# Patient Record
Sex: MALE | Race: ASIAN | Employment: FULL TIME | URBAN - METROPOLITAN AREA
[De-identification: names, ages, dates, MRNs, and addresses within clinical notes are randomized per-mention and may not be internally consistent; named-entity substitution may affect disease eponyms.]

---

## 2021-06-22 ENCOUNTER — ORDER TRANSCRIPTION (OUTPATIENT)
Dept: PHYSICAL THERAPY | Facility: HOSPITAL | Age: 46
End: 2021-06-22

## 2021-06-22 DIAGNOSIS — I89.0 LYMPHEDEMA OF ARM: Primary | ICD-10-CM

## 2022-01-03 ENCOUNTER — APPOINTMENT (OUTPATIENT)
Dept: CV DIAGNOSTICS | Facility: HOSPITAL | Age: 47
DRG: 193 | End: 2022-01-03
Attending: INTERNAL MEDICINE
Payer: COMMERCIAL

## 2022-01-03 ENCOUNTER — HOSPITAL ENCOUNTER (OUTPATIENT)
Age: 47
Discharge: ANOTHER HOSPITAL | End: 2022-01-03
Payer: COMMERCIAL

## 2022-01-03 ENCOUNTER — HOSPITAL ENCOUNTER (INPATIENT)
Facility: HOSPITAL | Age: 47
LOS: 3 days | Discharge: HOME | DRG: 193 | End: 2022-01-06
Attending: EMERGENCY MEDICINE | Admitting: INTERNAL MEDICINE
Payer: COMMERCIAL

## 2022-01-03 ENCOUNTER — APPOINTMENT (OUTPATIENT)
Dept: GENERAL RADIOLOGY | Facility: HOSPITAL | Age: 47
DRG: 193 | End: 2022-01-03
Attending: EMERGENCY MEDICINE
Payer: COMMERCIAL

## 2022-01-03 ENCOUNTER — APPOINTMENT (OUTPATIENT)
Dept: CT IMAGING | Facility: HOSPITAL | Age: 47
DRG: 193 | End: 2022-01-03
Attending: EMERGENCY MEDICINE
Payer: COMMERCIAL

## 2022-01-03 VITALS
OXYGEN SATURATION: 80 % | HEART RATE: 134 BPM | SYSTOLIC BLOOD PRESSURE: 118 MMHG | RESPIRATION RATE: 18 BRPM | TEMPERATURE: 98 F | DIASTOLIC BLOOD PRESSURE: 93 MMHG

## 2022-01-03 DIAGNOSIS — J96.01 ACUTE RESPIRATORY FAILURE WITH HYPOXIA (HCC): Primary | ICD-10-CM

## 2022-01-03 DIAGNOSIS — E87.70 HYPERVOLEMIA, UNSPECIFIED HYPERVOLEMIA TYPE: ICD-10-CM

## 2022-01-03 DIAGNOSIS — R09.02 HYPOXIA: Primary | ICD-10-CM

## 2022-01-03 DIAGNOSIS — M79.89 LEFT ARM SWELLING: ICD-10-CM

## 2022-01-03 DIAGNOSIS — R00.0 TACHYCARDIA: ICD-10-CM

## 2022-01-03 PROBLEM — E87.1 HYPONATREMIA: Status: ACTIVE | Noted: 2022-01-03

## 2022-01-03 LAB
ALBUMIN SERPL-MCNC: 2.1 G/DL (ref 3.4–5)
ALBUMIN/GLOB SERPL: 0.5 {RATIO} (ref 1–2)
ALP LIVER SERPL-CCNC: 81 U/L
ALT SERPL-CCNC: 16 U/L
ANION GAP SERPL CALC-SCNC: 7 MMOL/L (ref 0–18)
APTT PPP: 135 SECONDS (ref 23.3–35.6)
APTT PPP: 34 SECONDS (ref 23.3–35.6)
AST SERPL-CCNC: 30 U/L (ref 15–37)
ATRIAL RATE: 128 BPM
BASOPHILS # BLD AUTO: 0.07 X10(3) UL (ref 0–0.2)
BASOPHILS NFR BLD AUTO: 0.6 %
BILIRUB SERPL-MCNC: 0.3 MG/DL (ref 0.1–2)
BUN BLD-MCNC: 10 MG/DL (ref 7–18)
CALCIUM BLD-MCNC: 8.3 MG/DL (ref 8.5–10.1)
CHLORIDE SERPL-SCNC: 93 MMOL/L (ref 98–112)
CO2 SERPL-SCNC: 24 MMOL/L (ref 21–32)
CREAT BLD-MCNC: 0.87 MG/DL
D DIMER PPP FEU-MCNC: 9.03 UG/ML FEU (ref ?–0.5)
EOSINOPHIL # BLD AUTO: 0.16 X10(3) UL (ref 0–0.7)
EOSINOPHIL NFR BLD AUTO: 1.3 %
ERYTHROCYTE [DISTWIDTH] IN BLOOD BY AUTOMATED COUNT: 15.5 %
GLOBULIN PLAS-MCNC: 4.1 G/DL (ref 2.8–4.4)
GLUCOSE BLD-MCNC: 137 MG/DL (ref 70–99)
HCT VFR BLD AUTO: 35.7 %
HGB BLD-MCNC: 12 G/DL
IMM GRANULOCYTES # BLD AUTO: 0.14 X10(3) UL (ref 0–1)
IMM GRANULOCYTES NFR BLD: 1.1 %
INR BLD: 1.02 (ref 0.8–1.2)
LYMPHOCYTES # BLD AUTO: 1.72 X10(3) UL (ref 1–4)
LYMPHOCYTES NFR BLD AUTO: 13.8 %
MCH RBC QN AUTO: 28.8 PG (ref 26–34)
MCHC RBC AUTO-ENTMCNC: 33.6 G/DL (ref 31–37)
MCV RBC AUTO: 85.8 FL
MONOCYTES # BLD AUTO: 1.11 X10(3) UL (ref 0.1–1)
MONOCYTES NFR BLD AUTO: 8.9 %
NEUTROPHILS # BLD AUTO: 9.29 X10 (3) UL (ref 1.5–7.7)
NEUTROPHILS # BLD AUTO: 9.29 X10(3) UL (ref 1.5–7.7)
NEUTROPHILS NFR BLD AUTO: 74.3 %
NT-PROBNP SERPL-MCNC: 1333 PG/ML (ref ?–125)
OSMOLALITY SERPL CALC.SUM OF ELEC: 259 MOSM/KG (ref 275–295)
P AXIS: 47 DEGREES
P-R INTERVAL: 130 MS
PLATELET # BLD AUTO: 593 10(3)UL (ref 150–450)
POTASSIUM SERPL-SCNC: 4.1 MMOL/L (ref 3.5–5.1)
PROCALCITONIN SERPL-MCNC: 0.51 NG/ML (ref ?–0.16)
PROT SERPL-MCNC: 6.2 G/DL (ref 6.4–8.2)
PROTHROMBIN TIME: 13.4 SECONDS (ref 11.6–14.8)
Q-T INTERVAL: 294 MS
QRS DURATION: 72 MS
QTC CALCULATION (BEZET): 429 MS
R AXIS: 65 DEGREES
RBC # BLD AUTO: 4.16 X10(6)UL
SARS-COV-2 RNA RESP QL NAA+PROBE: NOT DETECTED
SODIUM SERPL-SCNC: 124 MMOL/L (ref 136–145)
T AXIS: 43 DEGREES
TRIGL SERPL-MCNC: 385 MG/DL (ref 30–149)
TROPONIN I HIGH SENSITIVITY: 14 NG/L
VENTRICULAR RATE: 128 BPM
WBC # BLD AUTO: 12.5 X10(3) UL (ref 4–11)

## 2022-01-03 PROCEDURE — 99222 1ST HOSP IP/OBS MODERATE 55: CPT | Performed by: HOSPITALIST

## 2022-01-03 PROCEDURE — 93306 TTE W/DOPPLER COMPLETE: CPT | Performed by: INTERNAL MEDICINE

## 2022-01-03 PROCEDURE — 71045 X-RAY EXAM CHEST 1 VIEW: CPT | Performed by: EMERGENCY MEDICINE

## 2022-01-03 PROCEDURE — 99205 OFFICE O/P NEW HI 60 MIN: CPT | Performed by: NURSE PRACTITIONER

## 2022-01-03 PROCEDURE — U0002 COVID-19 LAB TEST NON-CDC: HCPCS | Performed by: NURSE PRACTITIONER

## 2022-01-03 RX ORDER — CODEINE PHOSPHATE AND GUAIFENESIN 10; 100 MG/5ML; MG/5ML
5 SOLUTION ORAL EVERY 6 HOURS PRN
COMMUNITY

## 2022-01-03 RX ORDER — HYDROCODONE BITARTRATE AND ACETAMINOPHEN 5; 325 MG/1; MG/1
2 TABLET ORAL EVERY 6 HOURS PRN
Status: DISCONTINUED | OUTPATIENT
Start: 2022-01-03 | End: 2022-01-06

## 2022-01-03 RX ORDER — EVEROLIMUS 5 MG/1
1 TABLET ORAL
COMMUNITY

## 2022-01-03 RX ORDER — LENVATINIB 18 MG/DAY
1 KIT ORAL
COMMUNITY

## 2022-01-03 RX ORDER — HEPARIN SODIUM 5000 [USP'U]/ML
80 INJECTION INTRAVENOUS; SUBCUTANEOUS ONCE
Status: COMPLETED | OUTPATIENT
Start: 2022-01-03 | End: 2022-01-03

## 2022-01-03 RX ORDER — PREDNISONE 50 MG/1
50 TABLET ORAL DAILY
COMMUNITY
End: 2022-01-06

## 2022-01-03 RX ORDER — AMLODIPINE BESYLATE 10 MG/1
10 TABLET ORAL DAILY
COMMUNITY
End: 2022-01-06

## 2022-01-03 RX ORDER — PREDNISONE 50 MG/1
50 TABLET ORAL EVERY 6 HOURS
Status: COMPLETED | OUTPATIENT
Start: 2022-01-03 | End: 2022-01-04

## 2022-01-03 RX ORDER — FUROSEMIDE 10 MG/ML
40 INJECTION INTRAMUSCULAR; INTRAVENOUS ONCE
Status: COMPLETED | OUTPATIENT
Start: 2022-01-03 | End: 2022-01-03

## 2022-01-03 RX ORDER — HYDROCODONE BITARTRATE AND ACETAMINOPHEN 5; 325 MG/1; MG/1
TABLET ORAL
COMMUNITY
Start: 2021-12-16

## 2022-01-03 RX ORDER — HEPARIN SODIUM AND DEXTROSE 10000; 5 [USP'U]/100ML; G/100ML
18 INJECTION INTRAVENOUS ONCE
Status: COMPLETED | OUTPATIENT
Start: 2022-01-03 | End: 2022-01-03

## 2022-01-03 RX ORDER — CODEINE PHOSPHATE AND GUAIFENESIN 10; 100 MG/5ML; MG/5ML
5 SOLUTION ORAL EVERY 6 HOURS PRN
Status: DISCONTINUED | OUTPATIENT
Start: 2022-01-03 | End: 2022-01-06

## 2022-01-03 RX ORDER — ATORVASTATIN CALCIUM 80 MG/1
80 TABLET, FILM COATED ORAL NIGHTLY
COMMUNITY

## 2022-01-03 RX ORDER — BENZONATATE 100 MG/1
100 CAPSULE ORAL 3 TIMES DAILY PRN
COMMUNITY
Start: 2021-12-27

## 2022-01-03 RX ORDER — ATORVASTATIN CALCIUM 80 MG/1
80 TABLET, FILM COATED ORAL NIGHTLY
Status: DISCONTINUED | OUTPATIENT
Start: 2022-01-03 | End: 2022-01-06

## 2022-01-03 RX ORDER — PROCHLORPERAZINE MALEATE 10 MG
10 TABLET ORAL EVERY 6 HOURS PRN
COMMUNITY

## 2022-01-03 RX ORDER — LISINOPRIL 2.5 MG/1
2.5 TABLET ORAL DAILY
COMMUNITY
End: 2022-01-06

## 2022-01-03 RX ORDER — ONDANSETRON 8 MG/1
8 TABLET, ORALLY DISINTEGRATING ORAL ONCE
COMMUNITY

## 2022-01-03 RX ORDER — HEPARIN SODIUM AND DEXTROSE 10000; 5 [USP'U]/100ML; G/100ML
INJECTION INTRAVENOUS CONTINUOUS
Status: DISCONTINUED | OUTPATIENT
Start: 2022-01-03 | End: 2022-01-04

## 2022-01-03 RX ORDER — ACETAMINOPHEN 325 MG/1
650 TABLET ORAL EVERY 6 HOURS PRN
Status: DISCONTINUED | OUTPATIENT
Start: 2022-01-03 | End: 2022-01-06

## 2022-01-03 RX ORDER — LEVOTHYROXINE SODIUM 0.05 MG/1
50 TABLET ORAL
COMMUNITY

## 2022-01-03 RX ORDER — DIPHENHYDRAMINE HCL 50 MG
50 CAPSULE ORAL ONCE
Status: COMPLETED | OUTPATIENT
Start: 2022-01-04 | End: 2022-01-04

## 2022-01-03 RX ORDER — BENZONATATE 100 MG/1
100 CAPSULE ORAL 3 TIMES DAILY PRN
Status: DISCONTINUED | OUTPATIENT
Start: 2022-01-03 | End: 2022-01-06

## 2022-01-03 NOTE — ED INITIAL ASSESSMENT (HPI)
PT ARRIVED TO ED FROM  FOR C/O LOW SPO2. PT STATES SPO2 WAS 85% RA AT HOME AND WHEN HE ARRIVED AT  IT IS WAS 76%. UPON ARRIVAL TO ED IT WAS 74% RA. PT DENIES FEELING SOB/CP.  PT IS CURRENTLY RECEIVING TX FOR KIDNEY CA.

## 2022-01-03 NOTE — ED PROVIDER NOTES
Patient Seen in: Immediate [address]      History   Patient presents with:  Difficulty Breathing    Stated Complaint: Oxygen levels have dropped. SPO2 dropped. Subjective:    This is a 21-year-old male with a history of kidney cancer sonny °F (36.4 °C)   Temp src Temporal   SpO2 (!) 80 %   O2 Device None (Room air)       Current:BP (!) 118/93   Pulse (!) 134   Temp 97.6 °F (36.4 °C) (Temporal)   Resp 18   SpO2 (!) 80%         Physical Exam  Vitals and nursing note reviewed.    Constitutional: found to be 80% on room air, tachycardic in the 130s. He is in no respiratory distress. Lung sounds are clear bilaterally. Skin is pale and dry. Reports no chest pain or shortness of breath.   He was placed on 4 L of supplemental O2 via nasal cannula wi

## 2022-01-03 NOTE — ED PROVIDER NOTES
Patient Seen in: [facility] Emergency Department      History   Patient presents with:  Difficulty Breathing    Stated Complaint: hypoxic from immediate care    Subjective:   HPI    This is a 44-year-old man history of renal cancer, currently on ora Edema  Pulmonary:  Pulmonary effort is normal.  Normal breath sounds. No wheezing, rhonchi or rales.    Abdominal: Soft nontender nondistended, normal bowel sounds, no guarding no rebound tenderness  Skin: Warm and dry  Neurological: Awake alert, speech is tach  Reading: Normal intervals normal axis no acute ischemic changes           XR CHEST AP PORTABLE  (CPT=71045)    Result Date: 1/3/2022  PROCEDURE:  XR CHEST AP PORTABLE  (CPT=71045)  TECHNIQUE:  AP chest radiograph was obtained. COMPARISON:  None.   IN and teaching time. MDM      59-year-old gentleman history of renal cancer currently on oral chemotherapy, here with volume overload.   He does have a 4 L oxygen requirement, BNP is elevated at 1300, bedside ultrasound shows no pericardial effusion, does

## 2022-01-03 NOTE — H&P
[facility]  History and Physical     Mateuszwclaudy Alicea Patient Status:  Emergency    10/8/1975 MRN [id number]   Location [address] [facility] Attending Andrew Munguia MD   Hosp Day # 0 PCP Omid Lee     Chief Complaint: pox 85% last prior to encounter. HYDROcodone-acetaminophen 5-325 MG Oral Tab, TAKE 1 TABLET BY MOUTH EVERY 6 HOURS AS NEEDED FOR PAIN USE AFTER NON-OPIOID PAIN MANAGEMENT STRATEGIES.  DO NOT TAKE WITH TYLENOL, Disp: , Rfl:   benzonatate 100 MG Oral Cap, Take 100 mg by 0.3   TP 6.2*       Estimated Creatinine Clearance: 88.2 mL/min (based on SCr of 0.87 mg/dL).     Recent Labs   Lab 01/03/22  0959   PTP 13.4   INR 1.02       COVID-19 Lab Results    COVID-19  Lab Results   Component Value Date    COVID19 Not Detected 01/03

## 2022-01-03 NOTE — ED QUICK NOTES
Orders for admission, patient is aware of plan and ready to go upstairs. Any questions, please call ED RN [address] [facility] at extension [phone].      Patient Covid vaccination status: Fully vaccinated     COVID Test Ordered in ED: Rapid SARS-CoV-2 by [address]

## 2022-01-03 NOTE — CONSULTS
[facility]  Report of Consultation    Caleb Amador Patient Status:  Inpatient    10/8/1975 MRN [id number]   [facility] 2NE-A Attending Yareli Rojas MD   Hosp Day # 0 PCP Ibeth Rogers     Reason for Consultation:  Hypoxia elevated ddimer pertinent family history. reports that he has never smoked. He has never used smokeless tobacco. He reports current alcohol use. He reports that he does not use drugs.   He works for the [employer]    Allergies: Unknown time  ondansetron 8 MG Oral Tablet Dispersible, Take 8 mg by mouth once.  30 mins prior to lenvatinib dose, Disp: , Rfl: , Past Month at Unknown time  prochlorperazine (COMPAZINE) 10 mg tablet, Take 10 mg by mouth every 6 (six) hours as needed for N edema to the joint space and above no obvious wounds noted some swelling in the left scapular area decreased range of motion--lower extremities without edema   Skin: No rashes or lesions.   Or tenderness slight bruising right calf   Neurological: Alert, int pulmonary infiltrates/Rales on exam--history of immune mediated pneumonitis by history- s/p steroids - unclear if resolving -- no O2 at home   · History of metastatic renal cell carcinoma 2017 abdominal bone left axilla left scapula now off most recent the

## 2022-01-04 ENCOUNTER — APPOINTMENT (OUTPATIENT)
Dept: CT IMAGING | Facility: HOSPITAL | Age: 47
DRG: 193 | End: 2022-01-04
Attending: INTERNAL MEDICINE
Payer: COMMERCIAL

## 2022-01-04 LAB
ALBUMIN SERPL-MCNC: 1.9 G/DL (ref 3.4–5)
ALBUMIN/GLOB SERPL: 0.6 {RATIO} (ref 1–2)
ALP LIVER SERPL-CCNC: 77 U/L
ALT SERPL-CCNC: 16 U/L
ANION GAP SERPL CALC-SCNC: 6 MMOL/L (ref 0–18)
APTT PPP: 97.9 SECONDS (ref 23.3–35.6)
AST SERPL-CCNC: 27 U/L (ref 15–37)
BASOPHILS # BLD AUTO: 0.04 X10(3) UL (ref 0–0.2)
BASOPHILS NFR BLD AUTO: 0.5 %
BILIRUB SERPL-MCNC: 0.2 MG/DL (ref 0.1–2)
BUN BLD-MCNC: 12 MG/DL (ref 7–18)
CALCIUM BLD-MCNC: 8.5 MG/DL (ref 8.5–10.1)
CHLORIDE SERPL-SCNC: 98 MMOL/L (ref 98–112)
CO2 SERPL-SCNC: 28 MMOL/L (ref 21–32)
CREAT BLD-MCNC: 0.63 MG/DL
EOSINOPHIL # BLD AUTO: 0.01 X10(3) UL (ref 0–0.7)
EOSINOPHIL NFR BLD AUTO: 0.1 %
ERYTHROCYTE [DISTWIDTH] IN BLOOD BY AUTOMATED COUNT: 15.2 %
GLOBULIN PLAS-MCNC: 3.1 G/DL (ref 2.8–4.4)
GLUCOSE BLD-MCNC: 158 MG/DL (ref 70–99)
HCT VFR BLD AUTO: 33.8 %
HGB BLD-MCNC: 10.7 G/DL
IMM GRANULOCYTES # BLD AUTO: 0.06 X10(3) UL (ref 0–1)
IMM GRANULOCYTES NFR BLD: 0.7 %
LYMPHOCYTES # BLD AUTO: 0.88 X10(3) UL (ref 1–4)
LYMPHOCYTES NFR BLD AUTO: 10.4 %
MAGNESIUM SERPL-MCNC: 2.3 MG/DL (ref 1.6–2.6)
MCH RBC QN AUTO: 28.2 PG (ref 26–34)
MCHC RBC AUTO-ENTMCNC: 31.7 G/DL (ref 31–37)
MCV RBC AUTO: 89.2 FL
MONOCYTES # BLD AUTO: 0.16 X10(3) UL (ref 0.1–1)
MONOCYTES NFR BLD AUTO: 1.9 %
NEUTROPHILS # BLD AUTO: 7.29 X10 (3) UL (ref 1.5–7.7)
NEUTROPHILS # BLD AUTO: 7.29 X10(3) UL (ref 1.5–7.7)
NEUTROPHILS NFR BLD AUTO: 86.4 %
OSMOLALITY SERPL CALC.SUM OF ELEC: 277 MOSM/KG (ref 275–295)
PHOSPHATE SERPL-MCNC: 4.8 MG/DL (ref 2.5–4.9)
PLATELET # BLD AUTO: 551 10(3)UL (ref 150–450)
POTASSIUM SERPL-SCNC: 5.3 MMOL/L (ref 3.5–5.1)
PROT SERPL-MCNC: 5 G/DL (ref 6.4–8.2)
RBC # BLD AUTO: 3.79 X10(6)UL
SODIUM SERPL-SCNC: 132 MMOL/L (ref 136–145)
TSI SER-ACNC: 0.75 MIU/ML (ref 0.36–3.74)
WBC # BLD AUTO: 8.4 X10(3) UL (ref 4–11)

## 2022-01-04 PROCEDURE — 71275 CT ANGIOGRAPHY CHEST: CPT | Performed by: INTERNAL MEDICINE

## 2022-01-04 PROCEDURE — 99233 SBSQ HOSP IP/OBS HIGH 50: CPT | Performed by: HOSPITALIST

## 2022-01-04 RX ORDER — ENOXAPARIN SODIUM 100 MG/ML
40 INJECTION SUBCUTANEOUS DAILY
Status: DISCONTINUED | OUTPATIENT
Start: 2022-01-04 | End: 2022-01-06

## 2022-01-04 NOTE — PROGRESS NOTES
[facility]  Progress Note    Hawk Cifuentes Patient Status:  Inpatient    10/8/1975 MRN [id number]   [facility] 2NE-A Attending Ramos Briones MD   [facility] Day # 1 PCP Ender Murray     Subjective:  Hawk Cifuentes is a(n) 51year old male remains afebri CREATSERUM 0.87 0.63*     Recent Labs   Lab 01/03/22  0959 01/03/22  1838 01/04/22  0434   PTP 13.4  --   --    INR 1.02  --   --    PTT 34.0 135.0* 97.9*       Cultures: Blood cultures pending/negative    Radiology:  CT ANGIOGRAPHY, CHEST (CPT=71275) related to the above-echo with hyperdynamic RV and LV now improved  · Bilateral pulmonary infiltrates---history of immune mediated pneumonitis by history- s/p steroids -extensive and right greater than left  · Bilateral at least moderate pleural effusions

## 2022-01-04 NOTE — BH PROGRESS NOTE
Assumed care at 1245  Patient a/o x 4  Heparin drip  Discontinued as ordered  lovenox started  Left arm pain relief w/ prn norco  Blood cx  Done   Zosyn started  Cont tele monitoring

## 2022-01-04 NOTE — PLAN OF CARE
Patient admitted to unit at 1450. AOX4. No distress. IV intact. Heparin running at 1100u-tolerating well. Skin intact. HR ST 130s. Echo completed. Premedicated for CTA tomorrow. Oriented to room and is able to use call light. Bed in low position.  On 3L NC.

## 2022-01-04 NOTE — PLAN OF CARE
Pt is A/Ox4. Vital signs stable. 3L O2. Currently 96%. Heparin gtt infusing. Premedication for CT scan in am. Left arm precautions, wrapped. Independent in room. Voiding.  All needs met overnight      Problem: Patient/Family Goals  Goal: Patient/Family Long

## 2022-01-04 NOTE — PLAN OF CARE
Patient alert and oriented x4. On 1 L per NC saturations in mid 90s. ST on tele. Continent of bowel and bladder. CT completed. Complained of L arm pain when arrived back on unit, PRN medication administered. On hep gtt. Call light within reach.      Problem

## 2022-01-05 ENCOUNTER — APPOINTMENT (OUTPATIENT)
Dept: ULTRASOUND IMAGING | Facility: HOSPITAL | Age: 47
DRG: 193 | End: 2022-01-05
Attending: INTERNAL MEDICINE
Payer: COMMERCIAL

## 2022-01-05 LAB
ANION GAP SERPL CALC-SCNC: 7 MMOL/L (ref 0–18)
APTT PPP: 29.1 SECONDS (ref 23.3–35.6)
BUN BLD-MCNC: 18 MG/DL (ref 7–18)
CALCIUM BLD-MCNC: 9 MG/DL (ref 8.5–10.1)
CHLORIDE SERPL-SCNC: 99 MMOL/L (ref 98–112)
CO2 SERPL-SCNC: 27 MMOL/L (ref 21–32)
CREAT BLD-MCNC: 0.79 MG/DL
GLUCOSE BLD-MCNC: 134 MG/DL (ref 70–99)
L PNEUMO AG UR QL: NEGATIVE
OSMOLALITY SERPL CALC.SUM OF ELEC: 280 MOSM/KG (ref 275–295)
POTASSIUM SERPL-SCNC: 5 MMOL/L (ref 3.5–5.1)
SODIUM SERPL-SCNC: 133 MMOL/L (ref 136–145)
STREP PNEUMO ANTIGEN, URINE: NEGATIVE

## 2022-01-05 PROCEDURE — 93971 EXTREMITY STUDY: CPT | Performed by: INTERNAL MEDICINE

## 2022-01-05 PROCEDURE — 99232 SBSQ HOSP IP/OBS MODERATE 35: CPT | Performed by: HOSPITALIST

## 2022-01-05 PROCEDURE — 93970 EXTREMITY STUDY: CPT | Performed by: INTERNAL MEDICINE

## 2022-01-05 RX ORDER — LEVOTHYROXINE SODIUM 0.05 MG/1
50 TABLET ORAL
Status: DISCONTINUED | OUTPATIENT
Start: 2022-01-05 | End: 2022-01-06

## 2022-01-05 NOTE — PROGRESS NOTES
[facility]  Progress note     Chani Poole Patient Status:  Emergency    10/8/1975 MRN [id number]   Location [address] [facility] [address] Attending Janeth James MD   [facility] Day # 2 PCP Ashlie Myers     Chief Complaint: pox 85% last night 1,333*       Creatinine Kinase  No results for input(s): CK in the last 168 hours. Inflammatory Markers  Recent Labs   Lab 01/03/22  0959   DDIMER 9.03*       Recent Labs   Lab 01/03/22  0959   TROPHS 14       Imaging: Imaging data reviewed in Epic.

## 2022-01-05 NOTE — PROGRESS NOTES
[facility]  Progress Note    Sukhjinder Mata Patient Status:  Inpatient    10/8/1975 MRN [id number]   [facility] 2NE-A Attending Wayne Hilario MD   [facility] Day # 2 PCP Roselia Bishop     Subjective:  Sukhjinder Mata is a(n) 51year old male remains afebri Recent Labs   Lab 01/03/22  0959 01/03/22  0959 01/03/22  1838 01/04/22  0434 01/05/22  0716   PTP 13.4  --   --   --   --    INR 1.02  --   --   --   --    PTT 34.0   < > 135.0* 97.9* 29.1    < > = values in this interval not displayed.        Cultures were negative. · Continuing antibiotics for pulmonary infiltrates and chest congestion  · Thoracentesis would be helpful to improve his oxygenation however he is hesitant. Can monitor while on abx with hopeful improvement.  Will need home O2 evaluation aida

## 2022-01-05 NOTE — PLAN OF CARE
Pt is A/Ox4. Vital signs stable. On 1L O2, O2 sats >90%. NSR/ST on tele. Continent BB, voiding. Up with sba in the room. IV zosyn. Left arm swollen, compression sleeve being applied 8pm-8am. All needs met overnight.  POC: US doppler BLE, LUE    Problem: Chela Torres

## 2022-01-05 NOTE — PAYOR COMM NOTE
--------------  ADMISSION REVIEW     Payor: Myra Swenson #:  [id number]  Authorization Number: [id number]    Admit date: 1/3/22  Admit time:  2:56 PM     Patient Seen in: [facility] Emergency Department      History   Patient presents w TROPONIN I HIGH SENSITIVITY - Normal   PROTHROMBIN TIME (PT) - Normal   PTT, ACTIVATED - Normal   CBC WITH DIFFERENTIAL WITH PLATELET    Narrative: The following orders were created for panel order CBC With Differential With Platelet.   Procedure is a 51year old male with renal ca with bone mets, dx in 2017, tx with immuntherapy at [facility]. No hx dvt or PE, all to iodine Mahnaz Gonsalez is a a(n) 51year old male. Never smoker. He has had multiple treatments including Jerelene Gutierrez in October  that resulted in troponin  · Tachycardia  · Bilateral pulmonary infiltrates/Rales on exam--history of immune mediated pneumonitis by history- s/p steroids - unclear if resolving -- no O2 at home   · History of metastatic renal cell carcinoma 2017 abdominal bone left axilla (36.4 °C) 93 17 104/75 92 % — — 1 L/min GC    01/04/22 2037 98.1 °F (36.7 °C) — 18 — 94 % — None (Room air) — GC    01/04/22 1609 97 °F (36.1 °C) 114 17 100/60 91 % — None (Room air) — DT    01/04/22 1239 97.8 °F (36.6 °C) 118 15 112/63 92 % — None (Room a

## 2022-01-05 NOTE — PLAN OF CARE
Assumed care of patient at 0730. Alert and oriented x4. Tele rhythm NSR and ST. On 2 L/min o2, will wean as tolerated. Breath sounds clear bilaterally. Bed locked and in low position. Call light and personal items within reach.  No c/o chest pain, sob, or n

## 2022-01-06 VITALS
BODY MASS INDEX: 20.56 KG/M2 | TEMPERATURE: 99 F | DIASTOLIC BLOOD PRESSURE: 89 MMHG | WEIGHT: 131 LBS | OXYGEN SATURATION: 95 % | SYSTOLIC BLOOD PRESSURE: 108 MMHG | RESPIRATION RATE: 22 BRPM | HEIGHT: 67 IN | HEART RATE: 124 BPM

## 2022-01-06 PROCEDURE — 99239 HOSP IP/OBS DSCHRG MGMT >30: CPT | Performed by: HOSPITALIST

## 2022-01-06 RX ORDER — CEFUROXIME AXETIL 500 MG/1
500 TABLET ORAL 2 TIMES DAILY
Qty: 12 TABLET | Refills: 0 | Status: SHIPPED | OUTPATIENT
Start: 2022-01-06 | End: 2022-01-12

## 2022-01-06 NOTE — CM/SW NOTE
Updated O2 order and documentation sent to E in [place]. Await approval. Per RN/MD, pt has history of bilateral PE's. Notified Mallory Herrera at [facility] regarding this.     [facility]  X:[phone]  I:[phone]    Horacio Benavides, MSW, LSW   /

## 2022-01-06 NOTE — PROGRESS NOTES
Patient seen and examined. Discharge if ok with consultants. Hospitalist portion of med rec completed.  Needs home o2 set up first    Yohana Snow MD  [facility]  Internal Medicine Hospitalist  Cell [phone]

## 2022-01-06 NOTE — DISCHARGE SUMMARY
[facility]  DISCHARGE SUMMARY     Mortimer Rife Patient Status:  Inpatient    10/8/1975 MRN [id number]   [facility] 2NE-A Attending Lady Bolivar MD   Hosp Day # 3 PCP Suri Hernandez     Date of Admission: 1/3/2022  Date of Discharge: 1 patients oncologist and decided against any further iv steroids for now (though required short course for contrast exposure, given allergy).       Lace+ Score: 33  59-90 High Risk  29-58 Medium Risk  0-28   Low Risk  [facility] 1 [facility] by mouth.  On hold since 12/23 see Oncologist 1/12/22   Refills: 0     levothyroxine 50 MCG Tabs  Commonly known as: SYNTHROID      Take 50 mcg by mouth before breakfast.   Refills: 0     ondansetron 8 MG Tbdp  Commonly known as: ZOFRAN-ODT edema.  -----------------------------------------------------------------------------------------------  PATIENT DISCHARGE INSTRUCTIONS: See electronic chart    Taylor Dominguez MD    Time spent:  > 30 minutes

## 2022-01-06 NOTE — PROGRESS NOTES
01/06/22 1309   Mobility   O2 walk?  Yes   SPO2% on Room Air at Rest 87   SPO2% on Oxygen at Rest 94   At rest oxygen flow (liters per minute) 3   SPO2% Ambulation on Room Air 83   SPO2% Ambulation on Oxygen 91   Ambulation oxygen flow (liters per min

## 2022-01-06 NOTE — PLAN OF CARE
Assumed care for patient at 0700. AOx4. Independent. Ambulating without difficulty. Sinus tachycardia on cardiac monitor. HR sustaining 110-120 at rest and increases to 140 with activity. Denies chest discomfort.  O2 walk completed, requires 2-3L NC at

## 2022-01-06 NOTE — PROGRESS NOTES
[facility]  Progress Note    Cate Hart Patient Status:  Inpatient    10/8/1975 MRN [id number]   [facility] 2NE-A Attending Olive Haddad MD   [facility] Day # 3 PCP Devi Reynolds     Subjective:  Cate Hart is a(n) 51year old male remainsa fe Radiology:  No results found.   Reviewed       Medications reviewed     Assessment and Plan:   Patient Active Problem List:     Sprain of ankle, unspecified site     Hyponatremia     Acute respiratory failure with hypoxia (HCC)     Hypervolemia, unspeci

## 2022-01-06 NOTE — CM/SW NOTE
Spoke with RN this morning during rounds. Pt is going to require home oxygen at hospital discharge. Order and supporting documentation sent to [facility] in [address].  Await approval.     MICHELLE Tong, [facility]   / Discharge Planner  [id number]    Laurie

## 2022-01-06 NOTE — PLAN OF CARE
Pt is A&Ox4. While awake pt was RA saturating 87% placed on 2L NC. Needed 3L NC while sleeping. ST on tele HR low 100s. SCDs applied. LUE lymphedema wrap on. Continent B&B. C/o shoulder pain, norco given with relief. Up with SBA. PIV flushes well.  Iv abx o

## 2022-01-07 ENCOUNTER — PATIENT OUTREACH (OUTPATIENT)
Dept: CASE MANAGEMENT | Age: 47
End: 2022-01-07

## 2022-01-07 DIAGNOSIS — Z02.9 ENCOUNTERS FOR ADMINISTRATIVE PURPOSE: ICD-10-CM

## 2022-01-07 NOTE — PROGRESS NOTES
Discharge     Patient cleared for discharge by all services. Discharge education and handout provided to patient. Discussed oxygen safety with handout and teach back. Follow-up appointments reviewed. Medications reviewed  Telemetry discontinued.   Al

## 2022-01-07 NOTE — PROGRESS NOTES
Initial Post Discharge Follow Up   Discharge Date: 1/6/22  Contact Date: 1/7/2022    Consent Verification:  Assessment Completed With: Patient  HIPAA Verified?   Yes     Discharge Dx:   Acute respiratory failure with hypoxia      Was TCC ordered: yes for cough. • atorvastatin 80 MG Oral Tab Take 80 mg by mouth nightly.      • levothyroxine 50 MCG Oral Tab Take 50 mcg by mouth before breakfast.     • Calcium Carb-Cholecalciferol (CALCIUM CARBONATE-VITAMIN D3 OR) Take 1 capsule by mouth 2 (two) times (Pj Peña)            [facility]  [address] [facility] [address]  [phone]          PCP TCM/HFU appointment: scheduled at D/C within 7-14 days  yes     ANAND Auguste

## 2022-01-07 NOTE — PAYOR COMM NOTE
--------------  DISCHARGE REVIEW    Payor: Jian Eda #:  [id number]  Authorization Number: [id number]    Admit date: 1/3/22  Admit time:   2:56 PM  Discharge Date: 1/6/2022  6:28 PM     Admitting Physician: Tati Vickers DO  Attending Ph his heart rate was slightly more elevated than normal 120s when normally he runs in the 100s.  He denies any chest pain denies any shortness of breath has had no syncope or presyncope episodes he has had no hemoptysis. He denies any prior history of PEs no capsule by mouth 2 (two) times a day. 600 mg (1500mg)-500 unit capsule   Refills: 0     everolimus 5 MG Tabs      Take 1 tablet by mouth.  On hold since 12/23 see Oncologist 1/12/22   Refills: 0     guaiFENesin-codeine 100-10 MG/5ML Soln  Commonly known as: [facility] KEILY Dasilva    Patient Instructions:                         Vital signs:  Temp:  [98.3 °F (36.8 °C)-98.7 °F (37.1 °C)] 98.7 °F (37.1 °C)  Pulse:  [] 124  Resp:  [18-22] 22  BP: ()/(63-89) 108/89    Physical Exam:    Gene

## 2025-02-13 NOTE — PROGRESS NOTES
01/05/22 1425   Mobility   SPO2% on Room Air at Rest 89   SPO2% on Oxygen at Rest 95   At rest oxygen flow (liters per minute) 2   SPO2% Ambulation on Room Air 81   SPO2% Ambulation on Oxygen 91   Ambulation oxygen flow (liters per minute) 2 Inbound call received from Patient requesting results from his blood work labs completed 02/10/2025. Patient's B-6 is still pending. Patient said he is mostly concerned about his Protein electrophoresis, serum which said it was abnormal.     Patient said he will schedule his MRI tomorrow and did not want the central scheduling phone number, he has it.     Patient confirmed his best call back number is [phone] and we may leave a detailed voicemail.    Dr. Mai please review labs and advise, thank you!

## (undated) NOTE — LETTER
Date & Time: 1/3/2022, 9:15 AM  Patient: Starling Mode  Encounter Provider(s):    KEILY Tomlinson         This certifies that Yudith Douglas, a patient at an [facility] facility, am leaving the facility voluntarily and against the advice

## (undated) NOTE — LETTER
Date & Time: 1/3/2022, 9:14 AM  Patient: Caleb Amador  Encounter Provider(s):    KEILY Gooden         This certifies that Chris Gardiner, a patient at an [facility] facility, am leaving the facility voluntarily and against the advice